# Patient Record
Sex: FEMALE | Race: WHITE | NOT HISPANIC OR LATINO | URBAN - METROPOLITAN AREA
[De-identification: names, ages, dates, MRNs, and addresses within clinical notes are randomized per-mention and may not be internally consistent; named-entity substitution may affect disease eponyms.]

---

## 2021-09-27 ENCOUNTER — TRANSFERRED RECORDS (OUTPATIENT)
Dept: HEALTH INFORMATION MANAGEMENT | Facility: CLINIC | Age: 61
End: 2021-09-27

## 2021-09-27 LAB — RETINOPATHY: NORMAL

## 2021-09-29 ENCOUNTER — TRANSCRIBE ORDERS (OUTPATIENT)
Dept: OTHER | Age: 61
End: 2021-09-29

## 2021-09-29 DIAGNOSIS — H02.831 DERMATOCHALASIS OF RIGHT UPPER EYELID: Primary | ICD-10-CM

## 2021-10-20 ENCOUNTER — OFFICE VISIT (OUTPATIENT)
Dept: OPHTHALMOLOGY | Facility: CLINIC | Age: 61
End: 2021-10-20
Attending: OPHTHALMOLOGY
Payer: COMMERCIAL

## 2021-10-20 ENCOUNTER — HOSPITAL ENCOUNTER (OUTPATIENT)
Facility: AMBULATORY SURGERY CENTER | Age: 61
End: 2021-10-20
Attending: OPHTHALMOLOGY | Admitting: OPHTHALMOLOGY
Payer: COMMERCIAL

## 2021-10-20 DIAGNOSIS — H02.834 DERMATOCHALASIS OF BOTH UPPER EYELIDS: Primary | ICD-10-CM

## 2021-10-20 DIAGNOSIS — H02.403 INVOLUTIONAL PTOSIS, ACQUIRED, BILATERAL: ICD-10-CM

## 2021-10-20 DIAGNOSIS — H02.834 DERMATOCHALASIS OF BOTH UPPER EYELIDS: ICD-10-CM

## 2021-10-20 DIAGNOSIS — H26.9 NUCLEAR CATARACT, NONSENILE: ICD-10-CM

## 2021-10-20 DIAGNOSIS — H02.831 DERMATOCHALASIS OF BOTH UPPER EYELIDS: Primary | ICD-10-CM

## 2021-10-20 DIAGNOSIS — E11.9 DIABETES MELLITUS WITHOUT COMPLICATION (H): ICD-10-CM

## 2021-10-20 DIAGNOSIS — H02.831 DERMATOCHALASIS OF BOTH UPPER EYELIDS: ICD-10-CM

## 2021-10-20 PROCEDURE — 92081 LIMITED VISUAL FIELD XM: CPT | Performed by: OPHTHALMOLOGY

## 2021-10-20 PROCEDURE — 92285 EXTERNAL OCULAR PHOTOGRAPHY: CPT | Performed by: OPHTHALMOLOGY

## 2021-10-20 PROCEDURE — 99204 OFFICE O/P NEW MOD 45 MIN: CPT | Performed by: OPHTHALMOLOGY

## 2021-10-20 RX ORDER — ATORVASTATIN CALCIUM 40 MG/1
TABLET, FILM COATED ORAL
COMMUNITY
Start: 2021-08-18

## 2021-10-20 RX ORDER — VENLAFAXINE 37.5 MG/1
TABLET ORAL
COMMUNITY
Start: 2021-10-05

## 2021-10-20 RX ORDER — KETOCONAZOLE 200 MG/1
TABLET ORAL
COMMUNITY
Start: 2021-02-08

## 2021-10-20 RX ORDER — LOSARTAN POTASSIUM 25 MG/1
TABLET ORAL
COMMUNITY
Start: 2021-08-18

## 2021-10-20 ASSESSMENT — TONOMETRY
OS_IOP_MMHG: 12
OD_IOP_MMHG: 17
IOP_METHOD: ICARE

## 2021-10-20 ASSESSMENT — SLIT LAMP EXAM - LIDS
COMMENTS: HEAVY DERMATOCHALASIS RESTING ON LASHES, TRUE PTOSIS
COMMENTS: HEAVY DERMATOCHALASIS RESTING ON LASHES, TRUE PTOSIS

## 2021-10-20 ASSESSMENT — EXTERNAL EXAM - RIGHT EYE: OD_EXAM: BROW PTOSIS, WITH FRONTALIS RELAXED, BROW IS BELOW SUPERIOR ORBITAL RIM AND LATERALLY INLINE WITH UPPER LASHES

## 2021-10-20 ASSESSMENT — VISUAL ACUITY
OD_SC: 20/30
OS_SC: 20/30
OD_SC+: -2
OS_SC+: +2
METHOD: SNELLEN - LINEAR

## 2021-10-20 ASSESSMENT — EXTERNAL EXAM - LEFT EYE: OS_EXAM: BROW PTOSIS, WITH FRONTALIS RELAXED, BROW IS BELOW SUPERIOR ORBITAL RIM AND LATERALLY INLINE WITH UPPER LASHES

## 2021-10-20 ASSESSMENT — CONF VISUAL FIELD: COMMENTS: TANGENT VISUAL FIELD PERFORMED TODAY.

## 2021-10-20 NOTE — NURSING NOTE
Chief Complaints and History of Present Illnesses   Patient presents with     Dermatochalasis Evaluation       Chief Complaint(s) and History of Present Illness(es)     Dermatochalasis Evaluation     Laterality: right upper lid              Comments     Referred by Dr. Baez from [facility] for dermatochalasis of right upper eyelid. Patient has noticed over the past few years her lids have been droopy and continue to get worse. Her right eye is worse than left eye. More noticeable in the mornings. Lids feel like there is a weight in them and they get tired easily. Patient states she likes to push and hold up her brows for better vision.   Family Hx of needing lid procedures done.     Diabetic, A1C: 6.5, 3 months ago                Jomar Parish Ophthalmic Assistant

## 2021-10-20 NOTE — PROGRESS NOTES
"Oculoplastic Clinic New Patient    Patient: Maria Dolores Kinney MRN# [id number]   YOB: 1960 Age: 61 year old   Date of Visit: Oct 20, 2021    CC: Droopy eyelids obstructing vision.              HPI:     Chief Complaint(s) and History of Present Illness(es)     Dermatochalasis Evaluation     Laterality: right upper lid              Comments     Referred by Dr. Baez from [facility] for dermatochalasis of right   upper eyelid. Patient has noticed over the past few years her lids have   been droopy and continue to get worse. Her right eye is worse than left   eye. More noticeable in the mornings. Lids feel like there is a weight in   them and they get tired easily. Patient states she likes to push and hold   up her brows for better vision.   Family Hx of needing lid procedures done.     Diabetic, A1C: 6.5, 3 months ago      Historians: patient and her sister     Maria Dolores Kinney is a 61 year old female who has noted gradual onset of droopy eyelids over the past years. The droopy eyelid is interfering with activities of daily living including driving, and reading. The patient denies double vision, variability of the eyelid position. She does have \"dry eyes\" which she blames for her eyes watering. She does not use artificial tears.     EXAM:     MRD1: 1 mm ou  Dermatochalasis with excess skin touching eyelashes   Brow ptosis with brow resting below superior orbital rim   Aponeurotic ptosis     VISUAL FIELD:  Right eye untaped:5 degrees Right eye taped:42 degrees  Left eye untaped:5 degrees Left eye taped:40 degrees    Assessment & Plan     Maria Dolores Kinney is a 61 year old female with the following diagnoses:   1. Dermatochalasis of both upper eyelids    2. Involutional ptosis, acquired, bilateral    3. Nuclear cataract, nonsenile    4. Diabetes mellitus without complication (H)    No Background diabetic retinopathy at her last dilated.      Both upper eyelid blepharoplasty (skin orbic a bit more " aggressive right due to brow ptosis) and Bilateral ptosis repair MMCR 8 mm     Pointed out brow ptosis. She is not interested in elevating the eye brows at this time.   Discussed dry eye could worsen, and I would expect it to initially.       ANTICOAGULATION:    Aspirin 81          PHOTOS DEMONSTRATE:    Significant dermatochalasis with lids resting on eyelashes and obstructing visual axis  Blepharoptosis  Brow ptosis with thicker brow skin and hairs below the lateral superior orbital rim    Attending Physician Attestation: Complete documentation of historical and exam elements from today's encounter can be found in the full encounter summary report (not reduplicated in this progress note). I personally obtained the chief complaint(s) and history of present illness. I confirmed and edited as necessary the review of systems, past medical/surgical history, family history, social history, and examination findings as documented by others; and I examined the patient myself. I personally reviewed the relevant tests, images, and reports as documented above. I formulated and edited as necessary the assessment and plan and discussed the findings and management plan with the patient. Jem Bonilla MD      Today with Maria Dolores Kinney and her sister I reviewed the indications, risks, benefits, and alternatives of the proposed surgical procedure including, but not limited to, failure obtain the desired result  and need for additional surgery, bleeding, infection, loss of vision, loss of the eye, and the remote possibility of permanent damage to any organ system or death with the use of anesthesia.  I provided multiple opportunities for the questions, answered all questions to the best of my ability, and confirmed that my answers and my discussion were understood.

## 2021-10-20 NOTE — LETTER
" 10/20/2021         RE:  :  MRN: Maria Dolores Kinney  1960  [id number]     Dear Dr. Baez,    Thank you for asking me to see your patient, Maria Dolores Kinney, for an oculoplastic   consultation.  My assessment and plan are below.  For further details, please see my attached clinic note.           HPI:     Chief Complaint(s) and History of Present Illness(es)     Dermatochalasis Evaluation     Laterality: right upper lid              Comments     Referred by Dr. Baez from [facility] for dermatochalasis of right   upper eyelid. Patient has noticed over the past few years her lids have   been droopy and continue to get worse. Her right eye is worse than left   eye. More noticeable in the mornings. Lids feel like there is a weight in   them and they get tired easily. Patient states she likes to push and hold   up her brows for better vision.   Family Hx of needing lid procedures done.     Diabetic, A1C: 6.5, 3 months ago      Historians: patient and her sister     Maria Dolores Kinney is a 61 year old female who has noted gradual onset of droopy eyelids over the past years. The droopy eyelid is interfering with activities of daily living including driving, and reading. The patient denies double vision, variability of the eyelid position. She does have \"dry eyes\" which she blames for her eyes watering. She does not use artificial tears.     EXAM:     MRD1: 1 mm ou  Dermatochalasis with excess skin touching eyelashes   Brow ptosis with brow resting below superior orbital rim   Aponeurotic ptosis     VISUAL FIELD:  Right eye untaped:5 degrees Right eye taped:42 degrees  Left eye untaped:5 degrees Left eye taped:40 degrees    Assessment & Plan     Maria Dolores Kinney is a 61 year old female with the following diagnoses:   1. Dermatochalasis of both upper eyelids    2. Involutional ptosis, acquired, bilateral    3. Nuclear cataract, nonsenile    4. Diabetes mellitus without complication (H)    No Background diabetic " retinopathy at her last dilated.      Both upper eyelid blepharoplasty (skin orbic a bit more aggressive right due to brow ptosis) and Bilateral ptosis repair MMCR 8 mm     Pointed out brow ptosis. She is not interested in elevating the eye brows at this time.   Discussed dry eye could worsen, and I would expect it to initially.       ANTICOAGULATION:    Aspirin 81         Again, thank you for allowing me to participate in the care of your patient.      Sincerely,    Jem Bonilla MD  Department of Ophthalmology and Visual Neurosciences  [facility]    CC: Alberto Baez MD  [facility]  [address] [place] [id number]  Via Fax: [phone]

## 2021-11-15 DIAGNOSIS — Z11.59 ENCOUNTER FOR SCREENING FOR OTHER VIRAL DISEASES: ICD-10-CM

## 2021-12-06 ENCOUNTER — TELEPHONE (OUTPATIENT)
Dept: OPHTHALMOLOGY | Facility: CLINIC | Age: 61
End: 2021-12-06
Payer: COMMERCIAL

## 2021-12-06 VITALS — BODY MASS INDEX: 32.1 KG/M2 | WEIGHT: 188 LBS | HEIGHT: 64 IN

## 2021-12-06 ASSESSMENT — MIFFLIN-ST. JEOR: SCORE: 1408.63

## 2021-12-06 NOTE — TELEPHONE ENCOUNTER
Writer spoke with patient who states that she will have to cancel for now since she is not able to pay for this out of pocket unless Dr. Parish can call and complete a peer to peer.     She does have a covid test scheduled Thursday. Please adviise.

## 2021-12-06 NOTE — TELEPHONE ENCOUNTER
Sergio afternoon Dr. Parish-    This patient's procedure scheduled 12/13/2021 has been deemed not medically necessary and cosmetic by her [employer] insurance. I've left a VM for the patient to call me back to discuss.    If you'd like to complete a peer to peer, you can do so by calling [phone] and referencing case # [id number].      Thanks you,    Hannah Thompson  Senior Intake Financial Counselor  [address] [address]  Ph: [phone]  Fax: [phone]

## 2021-12-07 NOTE — TELEPHONE ENCOUNTER
Good morning Dr. Danielle WEST reached out to [facility] and determined that the case was denied due to unclear photos received on their end. I sent an email with detailed documentation on what happened along the way.     I scheduled a peer to peer for 12/10/2021 at 9am. They will call my extension and I will transfer them to you once they reach out. Please provide a good contact phone number for you for Friday.     I re emailed the colored photos to [facility].    Sorry for the additional work. I appreciate your time spent on this,    Hannah Thompson  Senior Intake Financial Counselor  [facility]  [address] [address] [place] [id number]  Ph: [phone]  Fax: [phone]

## 2021-12-10 RX ORDER — LIDOCAINE 40 MG/G
CREAM TOPICAL
Status: CANCELLED | OUTPATIENT
Start: 2021-12-10

## 2021-12-10 RX ORDER — ONDANSETRON 4 MG/1
4 TABLET, ORALLY DISINTEGRATING ORAL EVERY 30 MIN PRN
Status: CANCELLED | OUTPATIENT
Start: 2021-12-10

## 2021-12-10 RX ORDER — SODIUM CHLORIDE, SODIUM LACTATE, POTASSIUM CHLORIDE, CALCIUM CHLORIDE 600; 310; 30; 20 MG/100ML; MG/100ML; MG/100ML; MG/100ML
INJECTION, SOLUTION INTRAVENOUS CONTINUOUS
Status: CANCELLED | OUTPATIENT
Start: 2021-12-10

## 2021-12-10 RX ORDER — FENTANYL CITRATE 50 UG/ML
25 INJECTION, SOLUTION INTRAMUSCULAR; INTRAVENOUS
Status: CANCELLED | OUTPATIENT
Start: 2021-12-10

## 2021-12-10 RX ORDER — OXYCODONE HYDROCHLORIDE 5 MG/1
5 TABLET ORAL EVERY 4 HOURS PRN
Status: CANCELLED | OUTPATIENT
Start: 2021-12-10

## 2021-12-10 RX ORDER — FENTANYL CITRATE 50 UG/ML
25 INJECTION, SOLUTION INTRAMUSCULAR; INTRAVENOUS EVERY 5 MIN PRN
Status: CANCELLED | OUTPATIENT
Start: 2021-12-10

## 2021-12-10 RX ORDER — ACETAMINOPHEN 325 MG/1
975 TABLET ORAL ONCE
Status: CANCELLED | OUTPATIENT
Start: 2021-12-10 | End: 2021-12-10

## 2021-12-10 RX ORDER — ONDANSETRON 2 MG/ML
4 INJECTION INTRAMUSCULAR; INTRAVENOUS EVERY 30 MIN PRN
Status: CANCELLED | OUTPATIENT
Start: 2021-12-10

## 2021-12-10 RX ORDER — MEPERIDINE HYDROCHLORIDE 25 MG/ML
12.5 INJECTION INTRAMUSCULAR; INTRAVENOUS; SUBCUTANEOUS
Status: CANCELLED | OUTPATIENT
Start: 2021-12-10

## 2021-12-10 RX ORDER — METOPROLOL TARTRATE 1 MG/ML
1-2 INJECTION, SOLUTION INTRAVENOUS EVERY 5 MIN PRN
Status: CANCELLED | OUTPATIENT
Start: 2021-12-10